# Patient Record
Sex: FEMALE | Race: OTHER | HISPANIC OR LATINO
[De-identification: names, ages, dates, MRNs, and addresses within clinical notes are randomized per-mention and may not be internally consistent; named-entity substitution may affect disease eponyms.]

---

## 2021-05-12 ENCOUNTER — RESULT REVIEW (OUTPATIENT)
Age: 31
End: 2021-05-12

## 2021-09-29 ENCOUNTER — APPOINTMENT (OUTPATIENT)
Dept: BARIATRICS | Facility: CLINIC | Age: 31
End: 2021-09-29
Payer: COMMERCIAL

## 2021-09-29 DIAGNOSIS — Z83.3 FAMILY HISTORY OF DIABETES MELLITUS: ICD-10-CM

## 2021-09-29 DIAGNOSIS — Z78.9 OTHER SPECIFIED HEALTH STATUS: ICD-10-CM

## 2021-09-29 DIAGNOSIS — O24.419 GESTATIONAL DIABETES MELLITUS IN PREGNANCY, UNSPECIFIED CONTROL: ICD-10-CM

## 2021-09-29 DIAGNOSIS — Z86.32 PERSONAL HISTORY OF GESTATIONAL DIABETES: ICD-10-CM

## 2021-09-29 PROBLEM — Z00.00 ENCOUNTER FOR PREVENTIVE HEALTH EXAMINATION: Status: ACTIVE | Noted: 2021-09-29

## 2021-09-29 PROCEDURE — 99204 OFFICE O/P NEW MOD 45 MIN: CPT | Mod: 95

## 2021-09-29 RX ORDER — ELECTROLYTES/DEXTROSE
SOLUTION, ORAL ORAL
Refills: 0 | Status: ACTIVE | COMMUNITY

## 2021-09-29 NOTE — CONSULT LETTER
[Dear  ___] : Dear ~NIDA, [Consult Letter:] : I had the pleasure of evaluating your patient, [unfilled]. [( Thank you for referring [unfilled] for consultation for _____ )] : Thank you for referring [unfilled] for consultation for [unfilled] [Please see my note below.] : Please see my note below. [Consult Closing:] : Thank you very much for allowing me to participate in the care of this patient.  If you have any questions, please do not hesitate to contact me. [Sincerely,] : Sincerely, [FreeTextEntry3] : Babs Huber FNP-BC, [facility]

## 2021-09-29 NOTE — PHYSICAL EXAM
[Alert] : alert [Well Nourished] : well nourished [Healthy Appearance] : healthy appearance [No Acute Distress] : no acute distress [Well Developed] : well developed [Normal Voice/Communication] : normal voice communication [Normal Gait] : normal gait [Acanthosis Nigricans] : no acanthosis nigricans [No Tremors] : no tremors [Oriented x3] : oriented to person, place, and time [Normal Affect] : the affect was normal [Recent Memory Normal] : recent memory was not impaired [Normal Insight/Judgement] : insight and judgment were intact [Normal Mood] : the mood was normal [Remote Memory Normal] : remote memory was not impaired

## 2021-09-29 NOTE — ASSESSMENT
[Carbohydrate Consistent Diet] : carbohydrate consistent diet [Importance of Diet and Exercise] : importance of diet and exercise to improve glycemic control, achieve weight loss and improve cardiovascular health [Exercise/Effect on Glucose] : exercise/effect on glucose [Self Monitoring of Blood Glucose] : self monitoring of blood glucose [FreeTextEntry1] : 31 year old female with diet controlled gestational diabetes \par Patient counseled on diagnosis and pathophysiology of gestational diabetes.  Increased risk of developing DMT2 in the future after pregnancy and need for regular screenings.  Reviewed possible complications including macrosomia, delivery complications, and  hypoglycemia.\par Reviewed glucometer procedure and patient demonstrated back proper technique.  instructed to check her BG 4x daily- fasting for BG target <90 and 1 Hr PP for BG target of <140.  Patient will keep logbook and bring it with her to call OB appointments.  \par Reviewed carbohydrate portion sizes and importance of avoiding simple carbohydrates.  Increase water intake\par Encouraged daily walking\par

## 2021-09-29 NOTE — HISTORY OF PRESENT ILLNESS
[FreeTextEntry1] : 31 year old female referred by the [facility] prenatal center for gestational diabetes.\par Patient reports healthy pregnancy otherwise.  +family history of DMT2- her mother.  Not currently exercising.  Food recall B milk and bread L rice and beets D grilled chicken and tortillas No snacks, inadequate water intake\par Brought in glucometer to learn how to use it

## 2021-09-29 NOTE — REASON FOR VISIT
[Initial Evaluation] : an initial evaluation [Gestational Diabetes] : gestational diabetes [Spouse] : spouse [[employer] ] : provided by [employer]   [Interpreters_IDNumber] : [id number] [Interpreters_FullName] : Kelvin [TWNoteComboBox1] : [place]

## 2022-07-11 ENCOUNTER — RESULT REVIEW (OUTPATIENT)
Age: 32
End: 2022-07-11

## 2022-07-12 ENCOUNTER — TRANSCRIPTION ENCOUNTER (OUTPATIENT)
Age: 32
End: 2022-07-12

## 2022-07-27 ENCOUNTER — APPOINTMENT (OUTPATIENT)
Dept: SURGERY | Facility: CLINIC | Age: 32
End: 2022-07-27

## 2022-07-27 VITALS
SYSTOLIC BLOOD PRESSURE: 105 MMHG | DIASTOLIC BLOOD PRESSURE: 67 MMHG | TEMPERATURE: 97.6 F | BODY MASS INDEX: 29.26 KG/M2 | HEIGHT: 62 IN | HEART RATE: 73 BPM | WEIGHT: 159 LBS

## 2022-07-27 DIAGNOSIS — Z87.19 PERSONAL HISTORY OF OTHER DISEASES OF THE DIGESTIVE SYSTEM: ICD-10-CM

## 2022-07-27 PROCEDURE — 99024 POSTOP FOLLOW-UP VISIT: CPT

## 2022-07-27 NOTE — PHYSICAL EXAM
[Normal Breath Sounds] : Normal breath sounds [Normal Rate and Rhythm] : normal rate and rhythm [JVD] : no jugular venous distention  [de-identified] : soft, NT/ND, incisions well healed

## 2022-07-27 NOTE — HISTORY OF PRESENT ILLNESS
[de-identified] : s/p robotic cholecystectomy.  Pathology reviewed showing mild chronic cholecystitis [de-identified] : Doing well.  Having mild right sided abdominal pain that continues to improved.  Tolerating a diet and denies N/V, fevers/chills

## 2022-09-12 ENCOUNTER — NON-APPOINTMENT (OUTPATIENT)
Age: 32
End: 2022-09-12

## 2022-09-28 ENCOUNTER — RESULT REVIEW (OUTPATIENT)
Age: 32
End: 2022-09-28